# Patient Record
Sex: FEMALE | Race: WHITE | Employment: FULL TIME | URBAN - METROPOLITAN AREA
[De-identification: names, ages, dates, MRNs, and addresses within clinical notes are randomized per-mention and may not be internally consistent; named-entity substitution may affect disease eponyms.]

---

## 2024-01-22 PROBLEM — Z00.00 WELL ADULT EXAM: Status: ACTIVE | Noted: 2021-05-05

## 2024-01-22 PROBLEM — Z12.11 SCREEN FOR COLON CANCER: Status: ACTIVE | Noted: 2021-05-05

## 2024-01-22 PROBLEM — Z13.29 THYROID DISORDER SCREEN: Status: ACTIVE | Noted: 2024-01-22

## 2024-01-22 PROBLEM — L85.3 DRY SKIN: Status: ACTIVE | Noted: 2024-01-22

## 2024-01-22 PROBLEM — Z13.21 ENCOUNTER FOR VITAMIN DEFICIENCY SCREENING: Status: ACTIVE | Noted: 2021-05-05

## 2024-02-21 PROBLEM — Z12.11 SCREEN FOR COLON CANCER: Status: RESOLVED | Noted: 2021-05-05 | Resolved: 2024-02-21

## 2024-02-21 PROBLEM — Z13.29 THYROID DISORDER SCREEN: Status: RESOLVED | Noted: 2024-01-22 | Resolved: 2024-02-21

## 2024-03-19 PROBLEM — R79.89 HIGH SERUM HIGH DENSITY LIPOPROTEIN (HDL): Status: ACTIVE | Noted: 2024-03-19

## 2024-03-19 PROBLEM — R79.89 HIGH SERUM VITAMIN B12: Status: ACTIVE | Noted: 2024-03-19

## 2024-03-19 NOTE — PROGRESS NOTES
[facility] [place]  [address]  [address]  [place] [address]  Dept: [phone]      3/19/24    Gabi Okeefe     Gyn Annual Exam      CHIEF COMPLAINT:    Chief Complaint   Patient presents with    Annual Exam     Last pap 21 WNL HPV- last mammogram 22 WNL     Establish Care     Last seen Katarzyna Torre CN 21              Blood pressure 116/85, height 1.727 m (5' 8\"), weight 64 kg (141 lb 3.2 oz), last menstrual period 03/15/2024, not currently breastfeeding.           HPI :     Gabi Okeefe 1980 is a 43 y.o.   who is here for her annual exam.  She is a previous patient of the midwives and this is her first time at this office. She denies any problems.  She recently had an hsv outbreak and would like refills        She reports still having periods every 28-29 days, lasting variable days.  Dysmenorrhea- mild   Menorrhagia- yes  Denies hot flushes or vaginal dryness.   Uses nothing for contraception.     Works as a dental hygienist.  Kids almost   _____________________________________________________________________  Past Medical History:   Diagnosis Date    Anxiety     Depression     Diet controlled gestational diabetes mellitus (GDM), antepartum 2018    Endometriosis 2014    noted on laparoscopy in     Herpes genitalis 2018 Start suppressive therapy at 36 weeks    URI with cough and congestion 2017                                                                   Past Surgical History:   Procedure Laterality Date    DILATION AND CURETTAGE      DILATION AND CURETTAGE OF UTERUS  14    with h/s and myosure    HYSTEROSCOPY      INTRAUTERINE DEVICE INSERTION  2015    LAPAROSCOPY  14    Operative L/S, Left Ovarian Cystotomy, Chromo, D&C with h/s, and Myosure     Family History   Problem Relation Age of Onset    Other Father         htn    High Blood Pressure

## 2024-03-21 ENCOUNTER — HOSPITAL ENCOUNTER (OUTPATIENT)
Age: 44
Setting detail: SPECIMEN
Discharge: HOME | End: 2024-03-21

## 2024-03-21 ENCOUNTER — OFFICE VISIT (OUTPATIENT)
Dept: OBGYN CLINIC | Age: 44
End: 2024-03-21
Payer: COMMERCIAL

## 2024-03-21 VITALS
BODY MASS INDEX: 21.4 KG/M2 | DIASTOLIC BLOOD PRESSURE: 85 MMHG | SYSTOLIC BLOOD PRESSURE: 116 MMHG | HEIGHT: 68 IN | WEIGHT: 141.2 LBS

## 2024-03-21 DIAGNOSIS — Z86.19 HX OF HERPES GENITALIS: ICD-10-CM

## 2024-03-21 DIAGNOSIS — Z12.31 ENCOUNTER FOR SCREENING MAMMOGRAM FOR BREAST CANCER: ICD-10-CM

## 2024-03-21 DIAGNOSIS — Z01.419 ENCOUNTER FOR GYNECOLOGICAL EXAMINATION: Primary | ICD-10-CM

## 2024-03-21 PROCEDURE — 99396 PREV VISIT EST AGE 40-64: CPT | Performed by: NURSE PRACTITIONER

## 2024-03-21 RX ORDER — VALACYCLOVIR HYDROCHLORIDE 1 G/1
1000 TABLET, FILM COATED ORAL DAILY
Qty: 30 TABLET | Refills: 1 | Status: SHIPPED | OUTPATIENT
Start: 2024-03-21

## 2024-03-21 ASSESSMENT — ENCOUNTER SYMPTOMS
ABDOMINAL DISTENTION: 0
SHORTNESS OF BREATH: 0
RESPIRATORY NEGATIVE: 1
ALLERGIC/IMMUNOLOGIC NEGATIVE: 1
COUGH: 0
BACK PAIN: 0
GASTROINTESTINAL NEGATIVE: 1

## 2024-03-21 ASSESSMENT — PATIENT HEALTH QUESTIONNAIRE - PHQ9
SUM OF ALL RESPONSES TO PHQ QUESTIONS 1-9: 0
SUM OF ALL RESPONSES TO PHQ QUESTIONS 1-9: 0
2. FEELING DOWN, DEPRESSED OR HOPELESS: NOT AT ALL
1. LITTLE INTEREST OR PLEASURE IN DOING THINGS: NOT AT ALL
SUM OF ALL RESPONSES TO PHQ QUESTIONS 1-9: 0
SUM OF ALL RESPONSES TO PHQ9 QUESTIONS 1 & 2: 0
SUM OF ALL RESPONSES TO PHQ QUESTIONS 1-9: 0

## 2024-03-21 NOTE — PROGRESS NOTES
Chaperone for Intimate Exam  Chaperone was offered as part of the rooming process. Patient declined and agrees to continue with exam without a chaperone.  Chaperone: NONE

## 2024-03-23 LAB
HPV I/H RISK 4 DNA CVX QL NAA+PROBE: NOT DETECTED
HPV SAMPLE: NORMAL
HPV, INTERPRETATION: NORMAL
HPV16 DNA CVX QL NAA+PROBE: NOT DETECTED
HPV18 DNA CVX QL NAA+PROBE: NOT DETECTED
SPECIMEN DESCRIPTION: NORMAL

## 2024-04-05 LAB — CYTOLOGY REPORT: NORMAL
